# Patient Record
Sex: MALE | Race: WHITE | Employment: FULL TIME | URBAN - METROPOLITAN AREA
[De-identification: names, ages, dates, MRNs, and addresses within clinical notes are randomized per-mention and may not be internally consistent; named-entity substitution may affect disease eponyms.]

---

## 2017-02-27 ENCOUNTER — OFFICE VISIT (OUTPATIENT)
Dept: INTERNAL MEDICINE CLINIC | Facility: CLINIC | Age: 27
End: 2017-02-27

## 2017-02-27 VITALS
HEIGHT: 70 IN | BODY MASS INDEX: 27.75 KG/M2 | DIASTOLIC BLOOD PRESSURE: 80 MMHG | TEMPERATURE: 99 F | SYSTOLIC BLOOD PRESSURE: 132 MMHG | WEIGHT: 193.81 LBS | HEART RATE: 73 BPM

## 2017-02-27 DIAGNOSIS — Z01.89 LABORATORY TEST: ICD-10-CM

## 2017-02-27 DIAGNOSIS — M25.562 ARTHRALGIA OF BOTH KNEES: ICD-10-CM

## 2017-02-27 DIAGNOSIS — Z00.00 ANNUAL PHYSICAL EXAM: Primary | ICD-10-CM

## 2017-02-27 DIAGNOSIS — M25.561 ARTHRALGIA OF BOTH KNEES: ICD-10-CM

## 2017-02-27 PROCEDURE — 99395 PREV VISIT EST AGE 18-39: CPT | Performed by: INTERNAL MEDICINE

## 2017-02-28 NOTE — PATIENT INSTRUCTIONS
ASSESSMENT/PLAN:   Annual physical exam  (primary encounter diagnosis)- exam ok   Laboratory test- will order some labs get them done and will let you know of the results   Arthralgia of both knees- will refer to ortho

## 2017-02-28 NOTE — PROGRESS NOTES
HPI:    Patient ID: Loki Coulter is a 27year old male. HPI  Pt comes in today with annual exam. Pt over all doing ok except for the knees which continue to bother him and they feels swollen and at times pain.  Pt last time was given referral for PT an Breast Cancer      Social History:   Smoking Status: Never Smoker                      Alcohol Use: Yes           1.2 oz/week       2 Cans of beer per week       PHYSICAL EXAM:    Physical Exam   Constitutional: He is oriented to person, place, and time.  H

## 2017-04-01 ENCOUNTER — LAB ENCOUNTER (OUTPATIENT)
Dept: LAB | Age: 27
End: 2017-04-01
Attending: INTERNAL MEDICINE
Payer: COMMERCIAL

## 2017-04-01 DIAGNOSIS — Z01.89 LABORATORY TEST: ICD-10-CM

## 2017-04-01 DIAGNOSIS — M25.562 ARTHRALGIA OF BOTH KNEES: ICD-10-CM

## 2017-04-01 DIAGNOSIS — M25.561 ARTHRALGIA OF BOTH KNEES: ICD-10-CM

## 2017-04-01 DIAGNOSIS — Z00.00 ANNUAL PHYSICAL EXAM: ICD-10-CM

## 2017-04-01 PROCEDURE — 85025 COMPLETE CBC W/AUTO DIFF WBC: CPT

## 2017-04-01 PROCEDURE — 81003 URINALYSIS AUTO W/O SCOPE: CPT

## 2017-04-01 PROCEDURE — 86038 ANTINUCLEAR ANTIBODIES: CPT

## 2017-04-01 PROCEDURE — 86039 ANTINUCLEAR ANTIBODIES (ANA): CPT

## 2017-04-01 PROCEDURE — 80053 COMPREHEN METABOLIC PANEL: CPT

## 2017-04-01 PROCEDURE — 84443 ASSAY THYROID STIM HORMONE: CPT

## 2017-04-01 PROCEDURE — 80061 LIPID PANEL: CPT

## 2017-04-01 PROCEDURE — 36415 COLL VENOUS BLD VENIPUNCTURE: CPT

## 2017-04-04 ENCOUNTER — TELEPHONE (OUTPATIENT)
Dept: INTERNAL MEDICINE CLINIC | Facility: CLINIC | Age: 27
End: 2017-04-04

## 2017-04-21 ENCOUNTER — OFFICE VISIT (OUTPATIENT)
Dept: ORTHOPEDICS CLINIC | Facility: CLINIC | Age: 27
End: 2017-04-21

## 2017-04-21 ENCOUNTER — HOSPITAL ENCOUNTER (OUTPATIENT)
Dept: GENERAL RADIOLOGY | Facility: HOSPITAL | Age: 27
Discharge: HOME | End: 2017-04-21
Attending: ORTHOPAEDIC SURGERY
Payer: COMMERCIAL

## 2017-04-21 DIAGNOSIS — M25.561 PAIN IN BOTH KNEES, UNSPECIFIED CHRONICITY: ICD-10-CM

## 2017-04-21 DIAGNOSIS — M22.42 PATELLA, CHONDROMALACIA, LEFT: Primary | ICD-10-CM

## 2017-04-21 DIAGNOSIS — M22.41 PATELLA, CHONDROMALACIA, RIGHT: ICD-10-CM

## 2017-04-21 DIAGNOSIS — M25.562 PAIN IN BOTH KNEES, UNSPECIFIED CHRONICITY: ICD-10-CM

## 2017-04-21 PROCEDURE — 99243 OFF/OP CNSLTJ NEW/EST LOW 30: CPT | Performed by: ORTHOPAEDIC SURGERY

## 2017-04-21 PROCEDURE — 73565 X-RAY EXAM OF KNEES: CPT

## 2017-04-21 PROCEDURE — 73560 X-RAY EXAM OF KNEE 1 OR 2: CPT

## 2017-04-21 PROCEDURE — 99212 OFFICE O/P EST SF 10 MIN: CPT | Performed by: ORTHOPAEDIC SURGERY

## 2017-05-02 ENCOUNTER — OFFICE VISIT (OUTPATIENT)
Dept: RHEUMATOLOGY | Facility: CLINIC | Age: 27
End: 2017-05-02

## 2017-05-02 VITALS
BODY MASS INDEX: 27.2 KG/M2 | HEIGHT: 70 IN | HEART RATE: 62 BPM | TEMPERATURE: 98 F | SYSTOLIC BLOOD PRESSURE: 137 MMHG | DIASTOLIC BLOOD PRESSURE: 83 MMHG | WEIGHT: 190 LBS

## 2017-05-02 DIAGNOSIS — R76.8 POSITIVE ANA (ANTINUCLEAR ANTIBODY): Primary | ICD-10-CM

## 2017-05-02 PROCEDURE — 99212 OFFICE O/P EST SF 10 MIN: CPT | Performed by: INTERNAL MEDICINE

## 2017-05-02 PROCEDURE — 99244 OFF/OP CNSLTJ NEW/EST MOD 40: CPT | Performed by: INTERNAL MEDICINE

## 2017-05-02 NOTE — PROGRESS NOTES
Dear Dr. Jay Humphreys:    I saw your patient Сергей Pierre in consultation in my rheumatology clinic this afternoon at your request.  As you know, he is a 72-year-old gentleman who has long had trouble with his TMJs, since he was young.   He now has an oral devic oral and nasal ulcers, lymphadenopathy. No shortness of breath or chest pain. No trouble urinating. No dry eyes or mouth. When he raises his right arm the fingers of his right hand can sometimes turn white. Occasional TMJ headaches.   Some acid reflux

## 2017-08-14 ENCOUNTER — OFFICE VISIT (OUTPATIENT)
Dept: INTERNAL MEDICINE CLINIC | Facility: CLINIC | Age: 27
End: 2017-08-14

## 2017-08-14 VITALS
HEART RATE: 71 BPM | HEIGHT: 70 IN | BODY MASS INDEX: 26.92 KG/M2 | DIASTOLIC BLOOD PRESSURE: 82 MMHG | SYSTOLIC BLOOD PRESSURE: 124 MMHG | WEIGHT: 188 LBS

## 2017-08-14 DIAGNOSIS — Z11.3 SCREENING FOR STD (SEXUALLY TRANSMITTED DISEASE): ICD-10-CM

## 2017-08-14 DIAGNOSIS — E78.5 HYPERLIPIDEMIA, UNSPECIFIED HYPERLIPIDEMIA TYPE: Primary | ICD-10-CM

## 2017-08-14 LAB
CHOLEST SERPL-MCNC: 192 MG/DL (ref 110–200)
HDLC SERPL-MCNC: 38 MG/DL
LDLC SERPL CALC-MCNC: 118 MG/DL (ref 0–99)
NONHDLC SERPL-MCNC: 154 MG/DL
TRIGL SERPL-MCNC: 179 MG/DL (ref 1–149)

## 2017-08-14 PROCEDURE — 36415 COLL VENOUS BLD VENIPUNCTURE: CPT | Performed by: INTERNAL MEDICINE

## 2017-08-14 PROCEDURE — 99212 OFFICE O/P EST SF 10 MIN: CPT | Performed by: INTERNAL MEDICINE

## 2017-08-14 PROCEDURE — 99213 OFFICE O/P EST LOW 20 MIN: CPT | Performed by: INTERNAL MEDICINE

## 2017-08-14 NOTE — PATIENT INSTRUCTIONS
ASSESSMENT/PLAN:   Hyperlipidemia, unspecified hyperlipidemia type  (primary encounter diagnosis)- will retest, continue to watch diet  Screening for std (sexually transmitted disease)- will test and let you know of the results

## 2017-08-14 NOTE — PROGRESS NOTES
HPI:    Patient ID: Ann Roger is a 24year old male. HPI  Comes in for follow-up.   Since last time we had labs done his cholesterol was elevated he has been watching his diet he wants to retest.  Patient also has new cough and wants to be tested fo There is no rebound and no guarding. Musculoskeletal: Normal range of motion. He exhibits no edema or tenderness. Neurological: He is alert and oriented to person, place, and time. He has normal reflexes. Skin: Skin is warm and dry.    Psychiatric: He

## 2017-08-15 LAB
C TRACH DNA SPEC QL NAA+PROBE: NEGATIVE
HBV SURFACE AG SERPL QL IA: NONREACTIVE
HCV AB SERPL QL IA: NONREACTIVE
HIV1+2 AB SERPL QL IA: NONREACTIVE
N GONORRHOEA DNA SPEC QL NAA+PROBE: NEGATIVE

## 2017-08-16 LAB — T PALLIDUM AB SER QL: NEGATIVE

## 2017-08-17 LAB
HSV TYPE 1/2 COMBINED AB, IGG: 0.66 IV
HSV TYPE 1/2 COMBINED ABS, IGM: 0.28 IV

## 2018-01-23 ENCOUNTER — OFFICE VISIT (OUTPATIENT)
Dept: SURGERY | Facility: CLINIC | Age: 28
End: 2018-01-23

## 2018-01-23 VITALS
HEIGHT: 70 IN | WEIGHT: 175 LBS | DIASTOLIC BLOOD PRESSURE: 60 MMHG | SYSTOLIC BLOOD PRESSURE: 112 MMHG | BODY MASS INDEX: 25.05 KG/M2

## 2018-01-23 DIAGNOSIS — I86.1 VARICOCELE: Primary | ICD-10-CM

## 2018-01-23 DIAGNOSIS — R10.2 MALE PELVIC PAIN: ICD-10-CM

## 2018-01-23 LAB
BILIRUB UR QL: NEGATIVE
COLOR UR: YELLOW
GLUCOSE UR-MCNC: NEGATIVE MG/DL
HGB UR QL STRIP.AUTO: NEGATIVE
KETONES UR-MCNC: NEGATIVE MG/DL
LEUKOCYTE ESTERASE UR QL STRIP.AUTO: NEGATIVE
NITRITE UR QL STRIP.AUTO: NEGATIVE
PH UR: 7 [PH] (ref 5–8)
PROT UR-MCNC: NEGATIVE MG/DL
RBC #/AREA URNS AUTO: 1 /HPF
SP GR UR STRIP: 1.02 (ref 1–1.03)
UROBILINOGEN UR STRIP-ACNC: <2
VIT C UR-MCNC: 20 MG/DL
WBC #/AREA URNS AUTO: <1 /HPF

## 2018-01-23 PROCEDURE — 99212 OFFICE O/P EST SF 10 MIN: CPT | Performed by: UROLOGY

## 2018-01-23 PROCEDURE — 99204 OFFICE O/P NEW MOD 45 MIN: CPT | Performed by: UROLOGY

## 2018-01-23 NOTE — PATIENT INSTRUCTIONS
1. Continue to self examine testicles on a monthly basis as is recommended for al adult males -- screening for testicular cancer    2. Left varicocele minimal to mild -- I reccomend observation    3.  If pelvic pain worsens, consider seeing physiatry physic In many cases, sperm count improves after treatment. Date Last Reviewed: 1/1/2017  © 8142-9417 [employer]. [address] [facility], [address] [place] [address]. All rights reserved.  This information is not intended as a substitute for professional me sealing off the enlarged veins using percutaneous embolization. A radiologic procedure called a venogram is used to create a map of the veins. A tube is then placed in the large vein in the groin.  Materials are injected through this tube into the enlarged

## 2018-01-23 NOTE — PROGRESS NOTES
Praveen Tse is a 24year old male. HPI:   Patient presents with: Other: patient presents for left varicocele check up    History provided by pt. Self referral    Scrotal Pain  Pt has noticed some recent intermittent scrotal pain. Severity is 2-3/10. Past Medical History:   Diagnosis Date   • Esophageal reflux    • TMJ disease       Past Surgical History:  No date: REPAIR OF NASAL SEPTUM  No date: TONSILLECTOMY   Family History   Problem Relation Age of Onset   • Asthma Father    • Other[other] Silke Cuellar Neurological: Oriented to time, place, person with normal affect  Exam appropriate for age; patellar reflexes normal bilaterally  Head/Face: normocephalic  Eyes/Vision: no scleral icterus  Neck/Thyroid: neck is supple without adenopathy  Lymphatic: no abno Pt has noticed some recent intermittent scrotal pain. Severity is 2-3/10. The pain lasts 3-4 seconds at a time and goes away after patient adjusts his legs. It is aggravated by certain positions while sitting down.  I explained to pt that pain may be a mild Duke Blanco MD,  personally performed the services described in this documentation. All medical record entries made by the scribe were at my direction and in my presence.   I have reviewed the chart and discharge instructions (if applicable) and ag

## 2018-01-31 ENCOUNTER — OFFICE VISIT (OUTPATIENT)
Dept: INTERNAL MEDICINE CLINIC | Facility: CLINIC | Age: 28
End: 2018-01-31

## 2018-01-31 VITALS
HEIGHT: 70 IN | SYSTOLIC BLOOD PRESSURE: 134 MMHG | TEMPERATURE: 98 F | BODY MASS INDEX: 27.17 KG/M2 | OXYGEN SATURATION: 100 % | WEIGHT: 189.81 LBS | HEART RATE: 85 BPM | DIASTOLIC BLOOD PRESSURE: 85 MMHG

## 2018-01-31 DIAGNOSIS — R10.9 CHRONIC ABDOMINAL PAIN: Primary | ICD-10-CM

## 2018-01-31 DIAGNOSIS — R19.7 DIARRHEA, UNSPECIFIED TYPE: ICD-10-CM

## 2018-01-31 DIAGNOSIS — G89.29 CHRONIC ABDOMINAL PAIN: Primary | ICD-10-CM

## 2018-01-31 DIAGNOSIS — K64.9 HEMORRHOIDS, UNSPECIFIED HEMORRHOID TYPE: ICD-10-CM

## 2018-01-31 PROCEDURE — 99212 OFFICE O/P EST SF 10 MIN: CPT | Performed by: INTERNAL MEDICINE

## 2018-01-31 PROCEDURE — 99213 OFFICE O/P EST LOW 20 MIN: CPT | Performed by: INTERNAL MEDICINE

## 2018-01-31 NOTE — PROGRESS NOTES
HPI:    Patient ID: Lisa Edmondson is a 24year old male. HPI patient comes in today with complaint of ongoing chronic abdominal discomfort pain cramping on and off diarrhea spinning on and off for a while.   Also times when he has diarrhea he feels a lo PHYSICAL EXAM:    Physical Exam   Constitutional: He is oriented to person, place, and time. He appears well-developed and well-nourished. HENT:   Head: Normocephalic and atraumatic.    Eyes: Conjunctivae and EOM are normal. Pupils are equal, round, a

## 2018-02-06 NOTE — PATIENT INSTRUCTIONS
ASSESSMENT/PLAN:   Chronic abdominal pain  (primary encounter diagnosis) this sounds more like irritable bowel syndrome will give referral to GI  Diarrhea, unspecified type as above follow with GI  Hemorrhoids, unspecified hemorrhoid type stable now with h

## 2018-03-10 PROCEDURE — 83516 IMMUNOASSAY NONANTIBODY: CPT | Performed by: INTERNAL MEDICINE

## 2018-03-10 PROCEDURE — 82784 ASSAY IGA/IGD/IGG/IGM EACH: CPT | Performed by: INTERNAL MEDICINE

## 2023-01-20 ENCOUNTER — PATIENT OUTREACH (OUTPATIENT)
Dept: CASE MANAGEMENT | Age: 33
End: 2023-01-20

## 2023-01-20 NOTE — PROCEDURES
The office order for PCP request is Approved and completed on January 20, 2023.     Thanks,  [facility] [employer]

## (undated) NOTE — MR AVS SNAPSHOT
Abigail  [address]  [phone]               Thank you for choosing us for your health care visit with Jerry Shafer MD.  We are glad to serve you and happy to provide you with this dai welcome for most exams. [facility]  Diagnostics Main [facility] Parking) (Yellow Parking)  [address]   [address] Ej Coppola [facility] Support Staff. Remember, MyChart is NOT to be used for urgent needs. For medical emergencies, dial 911.            Visit [facility] online at  [email or web address]

## (undated) NOTE — MR AVS SNAPSHOT
[facility]  [address] [place] [address], [address] [facility]               Thank you for choosing us for your health care visit with Maribell Daily MD.  We are glad to serve you and happy to provide you with this summary Support Staff. Remember, MyChart is NOT to be used for urgent needs. For medical emergencies, dial 911.            Visit [facility] online at  [email or web address]

## (undated) NOTE — MR AVS SNAPSHOT
Abigail  [address]  [phone]               Thank you for choosing us for your health care visit with Nadege Bourne MD.  We are glad to serve you and happy to provide you with this dai welcome for most exams. [facility]/Jossue Jose  Diagnostics Main [facility] Parking) (Yellow Parking)  [address]   [address] Ej Coppola [facility] Support Staff. Remember, MyChart is NOT to be used for urgent needs. For medical emergencies, dial 911.            Visit [facility] online at  [email or web address]

## (undated) NOTE — MR AVS SNAPSHOT
[facility]  [address] [facility] [id number] [phone]               Thank you for choosing us for your health care visit with Shaun Ball MD.  We are glad to serve you and happy to provide you with this summary of your vi [address]   Phone:  [phone]   Fax:  [phone]    Diagnoses:  Arthralgia of both knees   Order:  Orthopedic - Internal    Erwin Shafer MD   [address] [facility], [address]   Phone:  [phone] You have not been prescribed any medications. Tape TV     Sign up for [employer], your secure online medical record. [employer] will allow you to access patient instructions from your recent visit,  view other health information, and more.  To sign u Get your heart pumping – brisk walking, biking, swimming Even 10 minute increments are effective and add up over the week   2 ½ hours per week – spread out over time Use a lukas to keep you motivated   Don’t forget strength training with weights and resist